# Patient Record
Sex: FEMALE | Race: WHITE | URBAN - METROPOLITAN AREA
[De-identification: names, ages, dates, MRNs, and addresses within clinical notes are randomized per-mention and may not be internally consistent; named-entity substitution may affect disease eponyms.]

---

## 2017-09-15 ENCOUNTER — OFFICE VISIT (OUTPATIENT)
Dept: FAMILY MEDICINE | Facility: CLINIC | Age: 37
End: 2017-09-15

## 2017-09-15 VITALS
BODY MASS INDEX: 23.76 KG/M2 | WEIGHT: 139.2 LBS | SYSTOLIC BLOOD PRESSURE: 104 MMHG | TEMPERATURE: 98 F | RESPIRATION RATE: 20 BRPM | HEIGHT: 64 IN | HEART RATE: 64 BPM | DIASTOLIC BLOOD PRESSURE: 62 MMHG

## 2017-09-15 DIAGNOSIS — H10.32 ACUTE BACTERIAL CONJUNCTIVITIS OF LEFT EYE: Primary | ICD-10-CM

## 2017-09-15 PROCEDURE — 99202 OFFICE O/P NEW SF 15 MIN: CPT | Performed by: PHYSICIAN ASSISTANT

## 2017-09-15 RX ORDER — CIPROFLOXACIN HYDROCHLORIDE 3.5 MG/ML
1 SOLUTION/ DROPS TOPICAL
Qty: 1 BOTTLE | Refills: 0 | Status: SHIPPED | OUTPATIENT
Start: 2017-09-15 | End: 2017-09-22

## 2017-09-15 NOTE — MR AVS SNAPSHOT
"              After Visit Summary   9/15/2017    Gertrude Nj    MRN: [id number]           Patient Information     Date Of Birth          1980        Visit Information        Provider Department      9/15/2017 1:30 PM Dixie Godinez PA-C [facility]        Today's Diagnoses     Acute bacterial conjunctivitis of left eye    -  1       Follow-ups after your visit        Follow-up notes from your care team     Return if symptoms worsen or fail to improve.      Who to contact     If you have questions or need follow up information about today's clinic visit or your schedule please contact [facility] directly at [phone].  Normal or non-critical lab and imaging results will be communicated to you by MyChart, letter or phone within 4 business days after the clinic has received the results. If you do not hear from us within 7 days, please contact the clinic through MyChart or phone. If you have a critical or abnormal lab result, we will notify you by phone as soon as possible.  Submit refill requests through [employer] / [employer] or call your pharmacy and they will forward the refill request to us. Please allow 3 business days for your refill to be completed.          Additional Information About Your Visit        MyChart Information     [employer] lets you send messages to your doctor, view your test results, renew your prescriptions, schedule appointments and more. To sign up, go to [email or web address] . Click on \"Log in\" on the left side of the screen, which will take you to the Welcome page. Then click on \"Sign up Now\" on the right side of the page.     You will be asked to enter the access code listed below, as well as some personal information. Please follow the directions to create your username and password.     Your access code is: [email or web address]  Expires: 2017  4:34 PM     Your access code will  in 90 days. If you need help or a new code, please call " "your [facility] clinic or [phone].        Care EveryWhere ID     This is your Care EveryWhere ID. This could be used by other organizations to access your [facility] medical records  [id number]        Your Vitals Were     Pulse Temperature Respirations Height Breastfeeding? BMI (Body Mass Index)    64 98  F (36.7  C) (Oral) 20 1.619 m (5' 3.75\") Yes 24.08 kg/m2       Blood Pressure from Last 3 Encounters:   09/15/17 104/62   07/01/14 110/70   03/26/07 114/60    Weight from Last 3 Encounters:   09/15/17 63.1 kg (139 lb 3.2 oz)   03/26/07 62.6 kg (138 lb)   05/11/05 62.1 kg (137 lb)              Today, you had the following     No orders found for display         Today's Medication Changes          These changes are accurate as of: 9/15/17  4:34 PM.  If you have any questions, ask your nurse or doctor.               Start taking these medicines.        Dose/Directions    ciprofloxacin 0.3 % ophthalmic solution   Commonly known as:  CILOXAN   Used for:  Acute bacterial conjunctivitis of left eye   Started by:  Dixie Godinez PA-C        Dose:  1 drop   Place 1 drop Into the left eye every 4 hours (while awake) for 7 days   Quantity:  1 Bottle   Refills:  0            Where to get your medicines      These medications were sent to [facility] [id number] [place] [address] [address] [place] [address] [address] AT [facility] of y [address]  [address], [facility] [address]     Phone:  [phone]     ciprofloxacin 0.3 % ophthalmic solution                Primary Care Provider Office Phone # Fax #    Kemi Pichardo [phone] [address] [address]        Equal Access to Services     MIGEL MCCLENDON AH: Angelia Schaffer, wacelia early, qaybta kaallauryn leavitt, mercy edwards. So [facility] [phone].    ATENCIÓN: Si habla español, tiene a allred disposición servicios gratuitos de asistencia lingüística. Llame al [phone].    We " comply with applicable federal civil rights laws and [place] laws. We do not discriminate on the basis of race, color, national origin, age, disability sex, sexual orientation or gender identity.            Thank you!     Thank you for choosing [facility]  for your care. Our goal is always to provide you with excellent care. Hearing back from our patients is one way we can continue to improve our services. Please take a few minutes to complete the written survey that you may receive in the mail after your visit with us. Thank you!             Your Updated Medication List - Protect others around you: Learn how to safely use, store and throw away your medicines at www.disposemymeds.org.          This list is accurate as of: 9/15/17  4:34 PM.  Always use your most recent med list.                   Brand Name Dispense Instructions for use Diagnosis    ciprofloxacin 0.3 % ophthalmic solution    CILOXAN    1 Bottle    Place 1 drop Into the left eye every 4 hours (while awake) for 7 days    Acute bacterial conjunctivitis of left eye       prenatal multivitamin plus iron 27-0.8 MG Tabs per tablet      Take 1 tablet by mouth daily    Pregnancy, incidental

## 2017-09-15 NOTE — PROGRESS NOTES
Chief Complaint   Patient presents with     Conjunctivitis     left        HPI  Gertrude Nj is a 37 year old female with concern about the eye.    Symptoms include burning, redness, discharge and mattering in left eye for 3 days.     She  does not complain of itching and/or tearing.     Vision affected? No  Foreign body sensation? (corneal process)  No  Photophobia? No  Trauma? No  Wear contact lenses? Yes  Discharge? Yes  Known Exposure? Yes. Daughter had it 2-3 weeks ago.    Prior ophthalmological history. None    Past Medical History:   Diagnosis Date     NO ACTIVE PROBLEMS      Family History   Problem Relation Age of Onset     Cancer - colorectal Mother      dx age 53,  age 55     DIABETES Paternal Grandmother      Social History     Social History     Marital status:      Spouse name: N/A     Number of children: N/A     Years of education: N/A     Occupational History      [employer]     Social History Main Topics     Smoking status: Never Smoker     Smokeless tobacco: Never Used     Alcohol use Yes      Comment: occasionally     Drug use: No     Sexual activity: Yes     Partners: Male     Birth control/ protection: Condom     Other Topics Concern      Service No     Blood Transfusions No     Caffeine Concern No     Occupational Exposure No     Hobby Hazards No     Sleep Concern No     Stress Concern No     Weight Concern No     Special Diet Yes     pregnant     Exercise Yes     Seat Belt Yes     Social History Narrative    Caffeine intake/servings daily - 1    Calcium intake/servings daily - 3    Exercise 3-4 times weekly - describe aerobics    Sunscreen used - Yes    Seatbelts used - Yes    Guns stored in the home - No    Self Breast Exam - No    Pap test up to date -  Yes    Eye exam up to date -  Yes    Dental exam up to date -  No    DEXA scan up to date -  Not Applicable    Flex Sig/Colonoscopy up to date -  Not Applicable    Mammography up to date -  Not Applicable     "Immunizations reviewed and up to date - Yes    Abuse: Current or Past (Physical, Sexual or Emotional) - No    Do you feel safe in your environment - Yes    Do you cope well with stress - Yes    Do you suffer from insomnia - No    Last updated by: Ana Mae  3/26/2007             Patient Active Problem List   Diagnosis     NO ACTIVE PROBLEMS     Current Outpatient Prescriptions   Medication     ciprofloxacin (CILOXAN) 0.3 % ophthalmic solution     Prenatal Vit-Fe Fumarate-FA (PRENATAL MULTIVITAMIN  PLUS IRON) 27-0.8 MG TABS     No current facility-administered medications for this visit.        Allergies:  No Known Allergies    OBJECTIVE    Vitals:    09/15/17 1332   BP: 104/62   BP Location: Right arm   Patient Position: Chair   Cuff Size: Adult Regular   Pulse: 64   Resp: 20   Temp: 98  F (36.7  C)   TempSrc: Oral   Weight: 63.1 kg (139 lb 3.2 oz)   Height: 1.619 m (5' 3.75\")       PHYSICAL EXAMINATION    Patient is pleasant appearing 37 year old female , in no acute distress. Vitals noted    General: Patient appears well.   Eyes: left eye with findings of typical conjunctivitis noted; erythema. Discharge is not present.  PERRLA, no foreign body noted. No periorbital cellulitis. The corneas are clear and fundi normal.   Pupil size normal  Discharge  Ears: External ears and TMs normal BL.  Nose: Nasal mucosa pink and moist. No discharge.  Mouth / Throat: Mucous membranes moist. Normal dentition.  Pharynx non-erythematous, no exudates.   Neck: Supple, No thyromegaly or nodules. No lymphadenopathy.      ASSESSMENT  1. Acute bacterial conjunctivitis of left eye          PLAN  Antibiotic drops per order. Hygiene discussed.     Gertrude will return to the clinic if symptoms are changing or concern with follow up as discussed.  The patient understands and agrees with the plan.    Dixie Godinez"

## 2017-09-15 NOTE — NURSING NOTE
Gertrude Ondina is here for possible pink eye. C/O left redness, drainage and irritation for the past few days.  Questioned patient about current smoking habits.  Pt. has never smoked.  PULSE regular  My Chart:   CLASSIFICATION OF OVERWEIGHT AND OBESITY BY BMI                        Obesity Class           BMI(kg/m2)  Underweight                                    < 18.5  Normal                                         18.5-24.9  Overweight                                     25.0-29.9  OBESITY                     I                  30.0-34.9                             II                 35.0-39.9  EXTREME OBESITY             III                >40                            Patient's  BMI Body mass index is 24.08 kg/(m^2).  http://hin.nhlbi.nih.gov/menuplanner/menu.cgi  Pre-visit planning  Immunizations - up to date

## 2017-10-09 ENCOUNTER — ALLIED HEALTH/NURSE VISIT (OUTPATIENT)
Dept: FAMILY MEDICINE | Facility: CLINIC | Age: 37
End: 2017-10-09

## 2017-10-09 DIAGNOSIS — Z23 NEED FOR VACCINATION: Primary | ICD-10-CM

## 2017-10-09 PROCEDURE — 90471 IMMUNIZATION ADMIN: CPT | Performed by: FAMILY MEDICINE

## 2017-10-09 PROCEDURE — 90686 IIV4 VACC NO PRSV 0.5 ML IM: CPT | Performed by: FAMILY MEDICINE

## 2018-10-24 ENCOUNTER — OFFICE VISIT (OUTPATIENT)
Dept: FAMILY MEDICINE | Facility: CLINIC | Age: 38
End: 2018-10-24

## 2018-10-24 VITALS
DIASTOLIC BLOOD PRESSURE: 62 MMHG | HEIGHT: 63 IN | OXYGEN SATURATION: 98 % | WEIGHT: 137.2 LBS | TEMPERATURE: 98.2 F | BODY MASS INDEX: 24.31 KG/M2 | HEART RATE: 83 BPM | SYSTOLIC BLOOD PRESSURE: 100 MMHG

## 2018-10-24 DIAGNOSIS — J01.00 ACUTE NON-RECURRENT MAXILLARY SINUSITIS: Primary | ICD-10-CM

## 2018-10-24 DIAGNOSIS — Z71.89 ACP (ADVANCE CARE PLANNING): ICD-10-CM

## 2018-10-24 DIAGNOSIS — Z76.89 HEALTH CARE HOME: ICD-10-CM

## 2018-10-24 PROCEDURE — 99213 OFFICE O/P EST LOW 20 MIN: CPT | Performed by: PHYSICIAN ASSISTANT

## 2018-10-24 RX ORDER — AMOXICILLIN 875 MG
875 TABLET ORAL 2 TIMES DAILY
Qty: 20 TABLET | Refills: 0 | Status: CANCELLED | OUTPATIENT
Start: 2018-10-24 | End: 2018-11-03

## 2018-10-24 NOTE — PROGRESS NOTES
"CC: Sinus symptoms    History:  Gertrude is here today with 2 weeks of cough, sinus pressure, sore throat, and drainage (both PND and out nose) for the past 2 weeks. Cough is worst night, and is relatively productive. Denies fever, sweats, chills, SOB, other than mild fever of 100 degrees on day 1, which she took ibuprofen for, and it subsequently resolved.. So far has tried Sudafed, and Nyquil/Dayquil, without much relief    No significant history of sinus infection.     PMH, MEDICATIONS, ALLERGIES, SOCIAL AND FAMILY HISTORY in [facility] and reviewed by me personally.      ROS negative other than the symptoms noted above in the HPI.        Examination   /62 (BP Location: Left arm, Patient Position: Sitting, Cuff Size: Adult Regular)  Pulse 83  Temp 98.2  F (36.8  C) (Oral)  Ht 1.6 m (5' 3\")  Wt 62.2 kg (137 lb 3.2 oz)  LMP 10/24/2018  SpO2 98%  BMI 24.3 kg/m2       Constitutional: Sitting comfortably, in no acute distress. Vital signs noted  Eyes: pupils equal round reactive to light and accomodation, extra ocular movements intact  Ears: external canals and TMs free of abnormalities  Nose: patent, without mucosal abnormalities  Mouth and throat: without erythema or lesions of the mucosa  Neck:  no adenopathy, trachea midline and normal to palpation  Cardiovascular:  regular rate and rhythm, no murmurs, clicks, or gallops  Respiratory:  normal respiratory rate and rhythm, lungs clear to auscultation  SKIN: No jaundice/pallor/rash.   Psychiatric: mentation appears normal and affect normal/bright        A/P    ICD-10-CM    1. Acute non-recurrent maxillary sinusitis J01.00 amoxicillin (AMOXIL) 875 MG tablet     amoxicillin-clavulanate (AUGMENTIN) 875-125 MG per tablet   2. Health Care Home Z76.89    3. ACP (advance care planning) Z71.89        DISCUSSION:  Given duration of symptoms, recommended she complete 10 day course of Augmentin. She should take this twice daily with food, and should consider taking " probiotic or eating yogurt in between. Warned her of possible side effects including loose stool. Also warned that antibiotic may make birth control less effective. Provided her with recommendations on OTC therapies based on symptoms. She should contact me in 1 week if symptoms are not improving, or sooner with any intolerable side effects or worsening symptoms.    follow up visit: As needed    NIYAH [facility]

## 2018-10-24 NOTE — NURSING NOTE
Gertrude is here today for a cold and sinus issues.    Pre-visit Screening:  Immunizations:  up to date  Colonoscopy:  NA  Mammogram: NA  Asthma Action Test/Plan:  NA  PHQ9:  PHQ2 done today  GAD7:  NA  Questioned patient about current smoking habits Pt. has never smoked.  Ok to leave detailed message on voice mail for today's visit only Yes, phone # [phone]

## 2018-10-24 NOTE — MR AVS SNAPSHOT
"              After Visit Summary   10/24/2018    Gertrude Nj    MRN: [id number]           Patient Information     Date Of Birth          1980        Visit Information        Provider Department      10/24/2018 2:15 PM Dixie Godinez PA-C [facility]        Today's Diagnoses     Acute non-recurrent maxillary sinusitis    -  1    Health Care Home        ACP (advance care planning)           Follow-ups after your visit        Who to contact     If you have questions or need follow up information about today's clinic visit or your schedule please contact [facility] directly at [phone].  Normal or non-critical lab and imaging results will be communicated to you by MyChart, letter or phone within 4 business days after the clinic has received the results. If you do not hear from us within 7 days, please contact the clinic through MyChart or phone. If you have a critical or abnormal lab result, we will notify you by phone as soon as possible.  Submit refill requests through [employer] or call your pharmacy and they will forward the refill request to us. Please allow 3 business days for your refill to be completed.          Additional Information About Your Visit        MyChart Information     [employer] lets you send messages to your doctor, view your test results, renew your prescriptions, schedule appointments and more. To sign up, go to [email or web address] . Click on \"Log in\" on the left side of the screen, which will take you to the Welcome page. Then click on \"Sign up Now\" on the right side of the page.     You will be asked to enter the access code listed below, as well as some personal information. Please follow the directions to create your username and password.     Your access code is: [email or web address]  Expires: 2019  4:04 PM     Your access code will  in 90 days. If you need help or a new code, please call your [facility] clinic or " "[phone].        Care EveryWhere ID     This is your Care EveryWhere ID. This could be used by other organizations to access your [facility] medical records  [id number]        Your Vitals Were     Pulse Temperature Height Last Period Pulse Oximetry BMI (Body Mass Index)    83 98.2  F (36.8  C) (Oral) 1.6 m (5' 3\") 10/24/2018 98% 24.3 kg/m2       Blood Pressure from Last 3 Encounters:   10/24/18 100/62   09/15/17 104/62   07/01/14 110/70    Weight from Last 3 Encounters:   10/24/18 62.2 kg (137 lb 3.2 oz)   09/15/17 63.1 kg (139 lb 3.2 oz)   03/26/07 62.6 kg (138 lb)              Today, you had the following     No orders found for display         Today's Medication Changes          These changes are accurate as of 10/24/18  4:04 PM.  If you have any questions, ask your nurse or doctor.               Start taking these medicines.        Dose/Directions    amoxicillin-clavulanate 875-125 MG per tablet   Commonly known as:  AUGMENTIN   Used for:  Acute non-recurrent maxillary sinusitis   Started by:  Dixie Godinez PA-C        Dose:  1 tablet   Take 1 tablet by mouth 2 times daily for 10 days   Quantity:  20 tablet   Refills:  0            Where to get your medicines      These medications were sent to [facility] [id number] [place] [address] AT [facility] OF [facility] [address] & [address]  [address] [place] [address] 13 , [facility] [address]     Phone:  [phone]     amoxicillin-clavulanate 875-125 MG per tablet                Primary Care Provider Office Phone # Fax #    Kemi Pichardo [phone] [address]  [address] [place] [id number]        Equal Access to Services     MIGEL MCCLENDON AH: Angelia Schaffer, waaxda luqadaha, qaybta kaalmada adevargheseyailia, mercy edwards. So [facility] [phone].    ATENCIÓN: Si habla español, tiene a allred disposición servicios gratuitos de asistencia lingüística. Llame al [phone].    We comply with applicable " federal civil rights laws and [place] laws. We do not discriminate on the basis of race, color, national origin, age, disability, sex, sexual orientation, or gender identity.            Thank you!     Thank you for choosing [facility]  for your care. Our goal is always to provide you with excellent care. Hearing back from our patients is one way we can continue to improve our services. Please take a few minutes to complete the written survey that you may receive in the mail after your visit with us. Thank you!             Your Updated Medication List - Protect others around you: Learn how to safely use, store and throw away your medicines at www.disposemymeds.org.          This list is accurate as of 10/24/18  4:04 PM.  Always use your most recent med list.                   Brand Name Dispense Instructions for use Diagnosis    amoxicillin-clavulanate 875-125 MG per tablet    AUGMENTIN    20 tablet    Take 1 tablet by mouth 2 times daily for 10 days    Acute non-recurrent maxillary sinusitis

## 2019-01-09 ENCOUNTER — OFFICE VISIT (OUTPATIENT)
Dept: FAMILY MEDICINE | Facility: CLINIC | Age: 39
End: 2019-01-09

## 2019-01-09 ENCOUNTER — TELEPHONE (OUTPATIENT)
Dept: FAMILY MEDICINE | Facility: CLINIC | Age: 39
End: 2019-01-09

## 2019-01-09 VITALS
DIASTOLIC BLOOD PRESSURE: 68 MMHG | SYSTOLIC BLOOD PRESSURE: 112 MMHG | BODY MASS INDEX: 24.56 KG/M2 | WEIGHT: 138.6 LBS | OXYGEN SATURATION: 99 % | TEMPERATURE: 98.6 F | RESPIRATION RATE: 16 BRPM | HEIGHT: 63 IN | HEART RATE: 66 BPM

## 2019-01-09 DIAGNOSIS — J06.9 UPPER RESPIRATORY TRACT INFECTION, UNSPECIFIED TYPE: Primary | ICD-10-CM

## 2019-01-09 PROCEDURE — 99213 OFFICE O/P EST LOW 20 MIN: CPT | Performed by: FAMILY MEDICINE

## 2019-01-09 RX ORDER — GUAIFENESIN 600 MG/1
1200 TABLET, EXTENDED RELEASE ORAL 2 TIMES DAILY
Qty: 40 TABLET | Refills: 0 | Status: SHIPPED | OUTPATIENT
Start: 2019-01-09 | End: 2019-01-19

## 2019-01-09 ASSESSMENT — MIFFLIN-ST. JEOR: SCORE: 1287.82

## 2019-01-09 NOTE — PROGRESS NOTES
SUBJECTIVE: 38 year old female complaining of sore throat followed by nasal congestion, fatigue and headache for 2 day(s).   The patient describes marked fatigue and needing to stay home because she felt so awful.   The patient denies a history of fever, SOB, GI symptoms or pain.   Smoking history: No.   Relevant past medical history: positive for 2 young children.    OBJECTIVE: The patient appears healthy, alert, no distress, cooperative, smiling and fatigued.   EARS: negative  NOSE/SINUS: positive findings: mucosa erythematous and swollen, clear rhinorrhea   THROAT: normal and post nasal drainage   NECK:Neck supple. No adenopathy. Thyroid symmetric, normal size,, Carotids without bruits.   CHEST: Clear    ASSESSMENT: (J06.9) Upper respiratory tract infection, unspecified type  (primary encounter diagnosis)  Comment: note for work  Plan: guaiFENesin (MUCINEX) 600 MG 12 hr tablet        Symptomatic care with decongestants, fluids, tylenol/advil prn. Use GUAIFENESIN  MG OR TBCR, 1 tab po BID (Twice per day), D: 20, R: 0 for congestion and cough.    In addition, I have suggested that the patient   monitor for symptoms of bacterial infection expecting slow gradual resolution of viral URI as the natural course.

## 2019-01-09 NOTE — PATIENT INSTRUCTIONS
Upper respiratory tract infection, unspecified type  (primary encounter diagnosis)  Comment: note for work  Plan: guaiFENesin (MUCINEX) 600 MG 12 hr tablet        Symptomatic care with decongestants, fluids, tylenol/advil prn. Use GUAIFENESIN  MG OR TBCR, 1 tab po BID (Twice per day), D: 20, R: 0 for congestion and cough.    In addition, I have suggested that the patient   monitor for symptoms of bacterial infection expecting slow gradual resolution of viral URI as the natural course.

## 2019-01-09 NOTE — TELEPHONE ENCOUNTER
Patients  Ginger called in and left a message stating that the patient has not been feeling well at all for the last couple of days and he is wondering if it would be best for her to come in for an appointment.He explained that she has been having a lot of headaches, fatigue and congestion and things do not seem to be getting better. He is wondering if there are some things that are going around right now that have symptoms like this that need to be treated. I called him back and left a detailed message for Ginger stating that it would be best for the patient to come in and be seen to be further evaluated in case she does have something that should be treated. he was told to call back and schedule an appointment or to call back and speak with clinical staff if there are any further questions or concerns.

## 2019-01-09 NOTE — NURSING NOTE
Gertrude is here today for a URI.    Pre-visit Screening:  Immunizations:  up to date  Colonoscopy:  NA  Mammogram: NA  Asthma Action Test/Plan:  NA  PHQ9:  NA  GAD7:  NA  Questioned patient about current smoking habits Pt. has never smoked.  Ok to leave detailed message on voice mail for today's visit only Yes, phone # [phone]

## 2019-10-18 ENCOUNTER — ALLIED HEALTH/NURSE VISIT (OUTPATIENT)
Dept: FAMILY MEDICINE | Facility: CLINIC | Age: 39
End: 2019-10-18

## 2019-10-18 DIAGNOSIS — Z23 NEED FOR VACCINATION: Primary | ICD-10-CM

## 2019-10-18 PROCEDURE — 90471 IMMUNIZATION ADMIN: CPT | Performed by: FAMILY MEDICINE

## 2019-10-18 PROCEDURE — 90686 IIV4 VACC NO PRSV 0.5 ML IM: CPT | Performed by: FAMILY MEDICINE

## 2024-06-17 PROBLEM — Z76.89 HEALTH CARE HOME: Status: RESOLVED | Noted: 2018-10-24 | Resolved: 2024-06-17
